# Patient Record
Sex: FEMALE | Race: WHITE
[De-identification: names, ages, dates, MRNs, and addresses within clinical notes are randomized per-mention and may not be internally consistent; named-entity substitution may affect disease eponyms.]

---

## 2018-03-29 ENCOUNTER — HOSPITAL ENCOUNTER (EMERGENCY)
Dept: HOSPITAL 31 - C.ER | Age: 58
Discharge: HOME | End: 2018-03-29
Payer: COMMERCIAL

## 2018-03-29 VITALS
OXYGEN SATURATION: 96 % | HEART RATE: 93 BPM | SYSTOLIC BLOOD PRESSURE: 146 MMHG | DIASTOLIC BLOOD PRESSURE: 90 MMHG | TEMPERATURE: 97.8 F | RESPIRATION RATE: 20 BRPM

## 2018-03-29 VITALS — BODY MASS INDEX: 33.5 KG/M2
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
## 2018-03-29 DIAGNOSIS — S42.295A: Primary | ICD-10-CM
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
## 2018-03-29 NOTE — C.PDOC
History Of Present Illness


58 year old female presents to the ER with a complaint of left shoulder pain 

after having a mechanical fall PTA. Patient states she was walking fast when 

she fell and landed on her left shoulder. Patient is right hand dominant, 

denies head injury, LOC, or other injury. Denies dizziness or weakness prior. 


Time Seen by Provider: 03/29/18 19:55


Chief Complaint (Nursing): Upper Extremity Problem/Injury


History Per: Patient, Family (daughter)


History/Exam Limitations: no limitations


Onset/Duration Of Symptoms: Hrs


Current Symptoms Are (Timing): Still Present


Recent travel outside of the [place]: No





Past Medical History


Reviewed: Historical Data, Nursing Documentation, Vital Signs


Vital Signs: 


 Last Vital Signs











Temp  97.8 F   03/29/18 19:51


 


Pulse  93 H  03/29/18 19:51


 


Resp  20   03/29/18 19:51


 


BP  146/90   03/29/18 19:51


 


Pulse Ox  96   03/29/18 23:10














- Medical History


PMH: Depression, HTN, Hypercholesterolemia, Osteoporosis


Surgical History: Cholecystectomy, Endoscopy


Family History: States: Unknown Family Hx





- Social History


Hx Alcohol Use: No


Hx Substance Use: No





- Immunization History


Hx Tetanus Toxoid Vaccination: No


Hx Influenza Vaccination: No


Hx Pneumococcal Vaccination: No





Review Of Systems


Musculoskeletal: Positive for: Shoulder Pain.  Negative for: Neck Pain, Back 

Pain


Skin: Negative for: Bruising


Neurological: Negative for: Weakness, Numbness, Other (LOC)





Physical Exam





- Physical Exam


Appears: Non-toxic, Other (uncomfortable)


Skin: Normal Color, Warm, Dry


Head: Atraumatic, Normacephalic


Eye(s): bilateral: Normal Inspection, PERRL, EOMI


Nose: Normal


Oral Mucosa: Moist


Neck: Normal ROM, No Midline Cervical Tenderness, No Paracervical Tenderness, 

Supple


Chest: Symmetrical


Cardiovascular: Rhythm Regular


Respiratory: Normal Breath Sounds


Extremity: Tenderness (Proximal left upper arm), Capillary Refill (<2 sec), No 

Deformity, Other (Decreased ROM of left shoulder secondary to pain)


Pulses: Left Radial: Normal, Right Radial: Normal


Neurological/Psych: Oriented x3, Normal Speech, Normal Sensation





ED Course And Treatment


O2 Sat by Pulse Oximetry: 96 (Room air)


Pulse Ox Interpretation: Normal





- Other Rad


  ** Left Shoulder x-ray


X-Ray: Interpreted by Me, Viewed By Me


Interpretation: Nondisplaced proximal humerus fracture.


Progress Note: Left shoulder x-ray ordered, results were positive for 

nondisplaced proximal humerus fracture. Tylenol administered, patient refused 

other pain medication. X-ray results discussed with patient, she was placed in 

a shoulder sling by RN, and instructed to follow up with other; daughter 

presents at bedside who translated, patient understands and agrees with plan.





Disposition





- Disposition


Referrals: 


Yash Spann MD [Staff Provider] - 


Disposition: HOME/ ROUTINE


Disposition Time: 20:31


Condition: STABLE


Additional Instructions: 


Rest and ice the area. Follow up with bone doctor in 1-2 days.  





Vaya a el mdico ortopdico clnica en 1-3 mendosa sin falta, para mas evaluacin. 

[place] los medicamentos  amy indicado.


Prescriptions: 


oxyCODONE/Acetaminophen [Percocet 5/325 mg Tab] 1 tab PO QID PRN #20 tab


 PRN Reason: Pain


Instructions:  Shoulder Fracture (DC)


Forms:  [employer] (English)


Print Language: Georgian





- Clinical Impression


Clinical Impression: 


 Shoulder fracture








- PA / NP / Resident Statement


MD/DO has reviewed & agrees with the documentation as recorded.





- Scribe Statement


The provider has reviewed the documentation as recorded by the Scribe





Adin Vazquez





All medical record entries made by the Scribe were at my direction and 

personally dictated by me. I have reviewed the chart and agree that the record 

accurately reflects my personal performance of the history, physical exam, 

medical decision making, and the department course for this patient. I have 

also personally directed, reviewed, and agree with the discharge instructions 

and disposition.

## 2018-03-30 NOTE — RAD
PROCEDURE:  Left shoulder dated 03/29/18 



HISTORY:

Trauma 



COMPARISON:

No prior.



FINDINGS:



BONES:

There is a comminuted appearing nondisplaced fracture involving the 

humeral head and surgical neck



JOINTS:

Normal. Mild degenerative osteoarthritis of the left 

acromioclavicular joint.



SOFT TISSUES:

Normal.



OTHER FINDINGS:

None. 



IMPRESSION:

Comminuted nondisplaced fracture left humeral head and neck as 

described.  Mild degenerative osteoarthritis left acromioclavicular 

joint.

## 2018-11-16 ENCOUNTER — HOSPITAL ENCOUNTER (EMERGENCY)
Dept: HOSPITAL 31 - C.ER | Age: 58
Discharge: HOME | End: 2018-11-16
Payer: COMMERCIAL

## 2018-11-16 VITALS
HEART RATE: 78 BPM | OXYGEN SATURATION: 98 % | TEMPERATURE: 98 F | RESPIRATION RATE: 18 BRPM | DIASTOLIC BLOOD PRESSURE: 73 MMHG | SYSTOLIC BLOOD PRESSURE: 116 MMHG

## 2018-11-16 VITALS — BODY MASS INDEX: 33.5 KG/M2

## 2018-11-16 DIAGNOSIS — I10: ICD-10-CM

## 2018-11-16 DIAGNOSIS — R07.89: Primary | ICD-10-CM

## 2018-11-16 DIAGNOSIS — E78.00: ICD-10-CM

## 2018-11-16 NOTE — C.PDOC
History Of Present Illness


58 year old female presents to the ED complaining of lower chest discomfort and 

pain to left trapezius area for 2 weeks. Reports pain is digitally and 

positionally reproducible. States she was seen by PMD 2 days ago and was 

diagnosed with costochondritis. Patient was instructed to use ice packs and take

Motrin for pain but she reports noncompliance. Denies any rash, fever, chills, 

shortness of breath, cough, or any other symptoms.  


Time Seen by Provider: 18 15:33


Chief Complaint (Nursing): Chest Pain


History Per: Patient


History/Exam Limitations: no limitations


Onset/Duration Of Symptoms: Days


Current Symptoms Are (Timing): Still Present


Associated Symptoms: denies: Nausea, Diaphoresis, Syncope





Past Medical History


Reviewed: Historical Data, Nursing Documentation, Vital Signs


Vital Signs: 





                                Last Vital Signs











Temp  98.9 F   18 15:20


 


Pulse  92 H  18 15:20


 


Resp  20   18 15:20


 


BP  149/98 H  18 15:20


 


Pulse Ox  98   18 15:20














- Medical History


PMH: Depression, HTN, Hypercholesterolemia, Osteoporosis


   Denies: Colonic Polyps


Surgical History: Cholecystectomy, Endoscopy


Family History: States: No Known Family Hx





- Social History


Hx Alcohol Use: No


Hx Substance Use: No





- Immunization History


Hx Tetanus Toxoid Vaccination: No


Hx Influenza Vaccination: No


Hx Pneumococcal Vaccination: No





Review Of Systems


Except As Marked, All Systems Reviewed And Found Negative.


Constitutional: Negative for: Fever, Chills


Cardiovascular: Positive for: Chest Pain


Respiratory: Negative for: Cough, Shortness of Breath


Musculoskeletal: Positive for: Other (pain to left trapezius )


Skin: Negative for: Rash





Physical Exam





- Physical Exam


Appears: Non-toxic, No Acute Distress


Skin: Warm, Dry


Head: Normacephalic


Eye(s): bilateral: Normal Inspection


Nose: Normal


Oral Mucosa: Moist


Neck: Normal ROM, Supple


Chest: Other (digitally reproducible discomfort to inferior sternum and xiphoid 

area)


Cardiovascular: Rhythm Regular


Respiratory: Normal Breath Sounds, No Rales, No Rhonchi, No Wheezing


Gastrointestinal/Abdominal: Soft, No Tenderness


Extremity: Bilateral: Atraumatic, Normal Color And Temperature, Normal ROM


Neurological/Psych: Oriented x3, Normal Speech


Gait: Steady





ED Course And Treatment


ECG: Interpreted By Me


ECG Rhythm: Sinus Rhythm


ECG Interpretation: Normal


Rate From EC


O2 Sat by Pulse Oximetry: 98 (RA)


Pulse Ox Interpretation: Normal





Medical Decision Making


Medical Decision Making: 





musculoskeletal lower sternal discomfort, digitally and positionally 

reproducable, no rash, normal ekg x 2 weeks.


seen for same by PMD 2 days ago, w same dx





NSAIDS/ice instructed and pt reassured








Disposition


Doctor Will See Patient In The: Office


Counseled Patient/Family Regarding: Studies Performed, Diagnosis





- Disposition


Referrals: 


Shaik Iyer MD [Staff Provider] - 


Disposition: HOME/ ROUTINE


Disposition Time: 16:17


Condition: GOOD


Additional Instructions: 


ibuprofen 400 mg cada 6 horas 


bolsa de hielo encima del pecho 1/2 hora por hora nada caliente





EKG normal





Instructions:  Costochondritis


Forms:  [employer] (English)


Print Language: Azerbaijani





- Clinical Impression


Clinical Impression: 


 Chest wall discomfort








- Scribe Statement


The provider has reviewed the documentation as recorded by the Scribe


Nataliia Bird








All medical record entries made by the Scribe were at my direction and 

personally dictated by me. I have reviewed the chart and agree that the record 

accurately reflects my personal performance of the history, physical exam, medi

lyn decision making, and the department course for this patient. I have also 

personally directed, reviewed, and agree with the discharge instructions and 

disposition.

## 2018-11-18 ENCOUNTER — HOSPITAL ENCOUNTER (OUTPATIENT)
Dept: HOSPITAL 31 - C.ER | Age: 58
Setting detail: OBSERVATION
LOS: 2 days | Discharge: HOME | End: 2018-11-20
Attending: INTERNAL MEDICINE | Admitting: INTERNAL MEDICINE
Payer: COMMERCIAL

## 2018-11-18 VITALS — BODY MASS INDEX: 35.7 KG/M2

## 2018-11-18 DIAGNOSIS — M81.0: ICD-10-CM

## 2018-11-18 DIAGNOSIS — I31.3: ICD-10-CM

## 2018-11-18 DIAGNOSIS — E78.00: ICD-10-CM

## 2018-11-18 DIAGNOSIS — Z90.49: ICD-10-CM

## 2018-11-18 DIAGNOSIS — Z95.5: ICD-10-CM

## 2018-11-18 DIAGNOSIS — R07.89: Primary | ICD-10-CM

## 2018-11-18 DIAGNOSIS — I25.10: ICD-10-CM

## 2018-11-18 DIAGNOSIS — I10: ICD-10-CM

## 2018-11-18 DIAGNOSIS — M32.9: ICD-10-CM

## 2018-11-18 LAB
ALBUMIN SERPL-MCNC: 4.5 G/DL (ref 3.5–5)
ALBUMIN/GLOB SERPL: 1.4 {RATIO} (ref 1–2.1)
ALT SERPL-CCNC: 27 U/L (ref 9–52)
AST SERPL-CCNC: 34 U/L (ref 14–36)
BASOPHILS # BLD AUTO: 0.1 K/UL (ref 0–0.2)
BASOPHILS NFR BLD: 0.6 % (ref 0–2)
BUN SERPL-MCNC: 16 MG/DL (ref 7–17)
CALCIUM SERPL-MCNC: 9.4 MG/DL (ref 8.6–10.4)
EOSINOPHIL # BLD AUTO: 0.1 K/UL (ref 0–0.7)
EOSINOPHIL NFR BLD: 0.8 % (ref 0–4)
ERYTHROCYTE [DISTWIDTH] IN BLOOD BY AUTOMATED COUNT: 13.9 % (ref 11.5–14.5)
GFR NON-AFRICAN AMERICAN: > 60
HGB BLD-MCNC: 14.9 G/DL (ref 11–16)
LYMPHOCYTES # BLD AUTO: 2.4 K/UL (ref 1–4.3)
LYMPHOCYTES NFR BLD AUTO: 26.7 % (ref 20–40)
MCH RBC QN AUTO: 32.3 PG (ref 27–31)
MCHC RBC AUTO-ENTMCNC: 33.6 G/DL (ref 33–37)
MCV RBC AUTO: 96.2 FL (ref 81–99)
MONOCYTES # BLD: 1 K/UL (ref 0–0.8)
MONOCYTES NFR BLD: 10.9 % (ref 0–10)
NEUTROPHILS # BLD: 5.5 K/UL (ref 1.8–7)
NEUTROPHILS NFR BLD AUTO: 61 % (ref 50–75)
NRBC BLD AUTO-RTO: 0 % (ref 0–2)
PLATELET # BLD: 309 K/UL (ref 130–400)
PMV BLD AUTO: 7.9 FL (ref 7.2–11.7)
RBC # BLD AUTO: 4.6 MIL/UL (ref 3.8–5.2)
WBC # BLD AUTO: 9 K/UL (ref 4.8–10.8)

## 2018-11-18 PROCEDURE — 80053 COMPREHEN METABOLIC PANEL: CPT

## 2018-11-18 PROCEDURE — 93005 ELECTROCARDIOGRAM TRACING: CPT

## 2018-11-18 PROCEDURE — 84484 ASSAY OF TROPONIN QUANT: CPT

## 2018-11-18 PROCEDURE — 85025 COMPLETE CBC W/AUTO DIFF WBC: CPT

## 2018-11-18 PROCEDURE — 99285 EMERGENCY DEPT VISIT HI MDM: CPT

## 2018-11-18 PROCEDURE — 85378 FIBRIN DEGRADE SEMIQUANT: CPT

## 2018-11-18 PROCEDURE — 96374 THER/PROPH/DIAG INJ IV PUSH: CPT

## 2018-11-18 PROCEDURE — 71046 X-RAY EXAM CHEST 2 VIEWS: CPT

## 2018-11-18 PROCEDURE — 93306 TTE W/DOPPLER COMPLETE: CPT

## 2018-11-18 PROCEDURE — 36415 COLL VENOUS BLD VENIPUNCTURE: CPT

## 2018-11-18 PROCEDURE — 96372 THER/PROPH/DIAG INJ SC/IM: CPT

## 2018-11-18 NOTE — C.PDOC
History Of Present Illness


58 year old female presents to the emergency department with complaints of two 

weeks of hypertension, high cholesterol, and chest pain at the center of her 

chest worse with palpation and movement of the arms. Patient states that she was

seen at [facility] previously, where she had a CT PE that resulted negative. Patient 

was seen by a cardiologist on Thursday 11-15-18 and discharged home. Patient 

presented once again on 18 with the same complaints. She states that he 

pain is non-radiating, and she denies nausea, vomiting, sweating, shortness of 

breath, and leg swelling. 


Time Seen by Provider: 18 17:44


Chief Complaint (Nursing): Chest Pain


History Per: Patient


History/Exam Limitations: no limitations


Onset/Duration Of Symptoms: Other (two weeks)


Current Symptoms Are (Timing): Still Present


Quality: "Pain"





Past Medical History


Reviewed: Historical Data, Nursing Documentation, Vital Signs


Vital Signs: 





                                Last Vital Signs











Temp  98.3 F   18 17:24


 


Pulse  93 H  18 17:24


 


Resp  20   18 17:24


 


BP  157/61 H  18 17:24


 


Pulse Ox  97   18 17:24














- Medical History


PMH: Depression, HTN, Hypercholesterolemia, Osteoporosis


   Denies: Colonic Polyps


Surgical History: Cholecystectomy, Endoscopy


Family History: States: Unknown Family Hx





- Social History


Hx Alcohol Use: No


Hx Substance Use: No





- Immunization History


Hx Tetanus Toxoid Vaccination: No


Hx Influenza Vaccination: No


Hx Pneumococcal Vaccination: No





Review Of Systems


Except As Marked, All Systems Reviewed And Found Negative.


Constitutional: Negative for: Fever, Chills


Cardiovascular: Positive for: Chest Pain





Physical Exam





- Physical Exam


Appears: Non-toxic, No Acute Distress


Skin: Warm, Dry


Head: Atraumatic, Tenderness


Eye(s): bilateral: Normal Inspection, PERRL, EOMI


Neck: Normal, Supple


Chest: Symmetrical, Tenderness (to palpation at the center of the chest)


Cardiovascular: Rhythm Regular, No Murmur


Respiratory: No Rales, No Rhonchi, No Wheezing


Gastrointestinal/Abdominal: Soft, No Tenderness, No Guarding, No Rebound


Extremity: Normal ROM (all extremities)


Neurological/Psych: Oriented x3, Normal Speech, Normal Cognition





ED Course And Treatment





- Laboratory Results


Result Diagrams: 


                                 18 17:57





                                 18 17:57


O2 Sat by Pulse Oximetry: 97 (RA)


Pulse Ox Interpretation: Normal





Medical Decision Making


Medical Decision Makin yr old female w/ hx of CAD w/ 2 stents p/w chest pain x2 weeks after being 

seen by PMD, x3 hospital visits and Cardiologist (pt does not remember name of 

cards) Has had negative w/u but continues w/ pain. Will seek imaging and labs. 


Low pretest wells- pt has had previous negative CTPE per family: will seek dimer





Plan:


EKG


CMP


Troponin


CBC


D-Dimer


CXR 








XR unremarkable, labs unremarkable


low pretest wells, dimer negative


appreicate oncuslt w/ Abulijannet: to admit to [facility] given recurrent CP 


appreciate consult w/ Dr. ELEUTERIO [facility]- to admit to his service


pt in NAD, agreeable to plan 





Disposition





- Disposition


Disposition: HOSPITALIZED


Disposition Time: 21:19


Condition: GOOD





- Clinical Impression


Clinical Impression: 


 Chest pain








- Scribe Statement


The provider has reviewed the documentation as recorded by the Scribe (Bar martinez)


Provider Attestation: 


All medical record entries made by the Scribe were at my direction and 

personally dictated by me. I have reviewed the chart and agree that the record 

accurately reflects my personal performance of the history, physical exam, 

medical decision making, and the department course for this patient. I have also

personally directed, reviewed, and agree with the discharge instructions and 

disposition.

## 2018-11-19 LAB
CK MB SERPL-MCNC: 0.43 NG/ML (ref 0–3.38)
CK MB SERPL-MCNC: 0.44 NG/ML (ref 0–3.38)

## 2018-11-19 RX ADMIN — ENOXAPARIN SODIUM SCH MG: 40 INJECTION SUBCUTANEOUS at 10:33

## 2018-11-19 RX ADMIN — PANTOPRAZOLE SODIUM SCH MG: 40 TABLET, DELAYED RELEASE ORAL at 10:07

## 2018-11-19 NOTE — RAD
Chest x-ray two views 



History: Chest pain. 



Comparison: 12/26/2016 



Findings: 



Mild venous congestion. 



Patchy increased markings at the left lung base.  



Cardiomegaly. 



Atherosclerotic calcification at the aortic knob. 



Degenerative changes in the spine. 



Right hilar prominence. 



Impression:



Mild venous congestion. 



Patchy increased markings at the left lung base.  



Cardiomegaly. 



Atherosclerotic calcification at the aortic knob. 



Degenerative changes in the spine. 



Right hilar prominence.

## 2018-11-19 NOTE — CARD
--------------- APPROVED REPORT --------------





Date of service: 11/18/2018



EKG Measurement

Heart [id number]

NC [id number]

[id number]

[id number]

[id number]



<Conclusion>

Normal sinus rhythm

Left posterior fascicular block

Abnormal ECG

## 2018-11-19 NOTE — CP.PCM.CON
History of Present Illness





- History of Present Illness


History of Present Illness: 





I was asked to see patient by Dr chetna Rutherford.





Patient was seen 11/19/18 at 1735





Patient is a 58 year old female with CAD s/p PCI, HTn, DM who presents with 

chest pain.  The patient describes left sided chest pain which occurs at rest.  

She states she had associated dyspnea.  The pain is worse with inspiration.  She

previously went to [facility] but was discharged.  She denies recent travel or sick 

contacts.  She denies fever or chills.





Review of Systems





- Constitutional


Constitutional: absent: As Per HPI, Anorexia, Chills, Daytime Sleepiness, 

Excessive Sweating, Fatigue, Fever, Frequent Falls, Headache, Increased 

Appetite, Lethargy, Malaise, Night Sweats, Snoring, Sleep Apnea, Weight Gain, 

Weight Loss, Weakness, Other





- EENT


Eyes: absent: As Per HPI, Blind Spots, Blurred Vision, Change in Vision, 

Decreased Night Vision, Diplopia, Discharge, Dry Eye, Exophthalmos, Floaters, 

Irritation, Itchy Eyes, Loss of Peripheral Vision, Pain, Photophobia, Requires 

Corrective Lenses, Sees Flashes, Spots in Vision, Tunnel Vision, Other Visual 

Disturbances, Loss of Vision, Other


Nose/Mouth/Throat: absent: As Per HPI, Epistaxis, Nasal Congestion, Nasal 

Discharge, Nasal Obstruction, Nasal Trauma, Nose Pain, Post Nasal Drip, Sinus 

Pain, Sinus Pressure, Bleeding Gums, Change in Voice, Dental Pain, Dry Mouth, 

Dysphagia, Halitosis, Hoarsness, Lip Swelling, Mouth Lesions, Mouth Pain, 

Odynophagia, Sore Throat, Throat Swelling, Tongue Swelling, Facial Pain, Neck 

Pain, Neck Mass, Other





- Cardiovascular


Cardiovascular: Chest Pain at Rest





- Respiratory


Respiratory: absent: As Per HPI, Cough, Dyspnea, Hemoptysis, Dyspnea on 

Exertion, Wheezing, Snoring, Stridor, Pain on Inspiration, Chest Congestion, 

Excessive Mucous Production, Change in Mucous Color, Pain with Coughing, Other





- Gastrointestinal


Gastrointestinal: absent: As Per HPI, Abdominal Pain, Belching, Bloating, Change

in Bowel Habits, Change in Stool Character, Coffee Ground Emesis, Constipation, 

Cramping, Diarrhea, Dyspepsia, Dysphagia, Early Satiety, Excessive Flatus, Fecal

Incontinence, Heartburn, Hematemesis, Hematochezia, Loose Stools, Melena, 

Nausea, Odynophagia, Temesmus, Vomiting, Other





- Genitourinary


Genitourinary: absent: As Per HPI, Change in Urinary Stream, Difficulty 

Urinating, Dysuria, Flank Pain, Hematuria, Pyuria, Nocturia, Urinary 

Incontinence, Urinary Frequency, Urinary Hesitance, Urinary Urgency, Voiding 

Freq/Small Amts, Freq UTI, Hx Renal/Bladder Calculi, Hx /Renal Surgery, 

Bladder Distension, Other





- Musculoskeletal


Musculoskeletal: absent: As Per HPI, Abnormal Gait, Arthralgias, Atrophy, Back 

Pain, Deformity, Joint Swelling, Limited Range of Motion, Loss of Height, Muscle

Cramps, Muscle Weakness, Myalgias, Neck Pain, Numbness, Radiating Pain into 

Limb, Stiffness, Tingling, Other





- Integumentary


Integumentary: absent: As Per HPI, Acne, Alopecia, Bleeding Lesions, Change in 

Hair, Change in Nails, Change in Pigmentation, Changing Lesions, Dry Skin, 

Erythema, Furuncle, Hirsutism, Lesions, New Lesions, Non-Healing Lesions, 

Photosensitivity, Pruritus, Rash, Skin Pain, Skin Ulcer, Sores, Striae, 

Swelling, Unusual Bruising, Wounds, Jaundice, Other





- Neurological


Neurological: absent: As Per HPI, Abnormal Gait, Abnormal Hearing, Abnormal 

Movements, Abnormal Speech, Behavioral Changes, Burning Sensations, Confusion, 

Convulsions, Disequilibrium, Dizziness, Numbness, Focal Weakness, Frequent 

Falls, Headaches, Lack of Coordination, Loss of Vision, Memory Loss, 

Paresthesias, Radicular Pain, Restless Legs, Sensory Deficit, Syncope, Tingling,

Tremor, Vertigo, Weakness, Other Visual Disturbances, Other





- Psychiatric


Psychiatric: absent: As Per HPI, Abnormal Sleep Pattern, Anhedonia, Anxiety, 

Auditory Hallucinations, Behavioral Changes, Change in Appetite, Change in 

Libido, Confusion, Depression, Difficulty Concentrating, Hallucinations, 

Homicidal Ideation, Hopelessness, Irritability, Memory Loss, Mood Swings, Panic 

Attacks, Paranoia, Suicidal Ideation, Visual Hallucinations, Tactile Hallucina

tions, Other





- Endocrine


Endocrine: absent: As Per HPI, Change in Body Appearance, Change in Libido, Cold

Intolorance, Deepening of Voice, Excessive Sweating, Fatigue, Flushing, Heat 

Intolorance, Increase in Ring/Shoe/Hat Size, Palpitations, Polydipsia, Polypha

sukhdeep, Polyuria, Other





- Hematologic/Lymphatic


Hematologic: absent: As Per HPI, Easy Bleeding, Easy Bruising, Lymphadenopathy, 

Other





Past Patient History





- Infectious Disease


Hx of Infectious Diseases: None





- Past Medical History & Family History


Past Medical History?: Yes





- Past Social History


Smoking Status: Never Smoked





- CARDIAC


Hx Hypercholesterolemia: Yes


Hx Hypertension: Yes





- ENDOCRINE/METABOLIC


Hx Systemic Lupus Erythematosus: Yes





- MUSCULOSKELETAL/RHEUMATOLOGICAL


Hx Osteoporosis: Yes





- GASTROINTESTINAL


Hx Gastrointestinal Disorders: No





- PSYCHIATRIC


Hx Depression: Yes


Hx Substance Use: No





- SURGICAL HISTORY


Hx Cholecystectomy: Yes





- ANESTHESIA


Hx Anesthesia: Yes


Hx Anesthesia Reactions: No


Hx Malignant Hyperthermia: No





Meds


Allergies/Adverse Reactions: 


                                    Allergies











Allergy/AdvReac Type Severity Reaction Status Date / Time


 


iodine Allergy   Verified 11/18/18 17:27


 


shellfish derived Allergy   Verified 11/18/18 17:27


 


SEAFOOD Allergy Intermediate RASH Uncoded 11/18/18 17:27














- Medications


Medications: 


                               Current Medications





Aspirin (Ecotrin)  81 mg PO DAILY [facility], [facility]


   Last Admin: 11/19/18 10:02 Dose:  81 mg


Cyclobenzaprine HCl (Flexeril)  5 mg PO DAILY PRN


   PRN Reason: Pain, moderate (4-7)


Donepezil HCl (Aricept)  5 mg PO DAILY [facility], [facility]


   Last Admin: 11/19/18 09:55 Dose:  5 mg


Enoxaparin Sodium (Lovenox)  40 mg SC DAILY [facility], [facility]


   Last Admin: 11/19/18 10:33 Dose:  40 mg


Ergocalciferol (Drisdol 50,000 Intl Units Cap)  1 cap PO QWK [facility], [facility]1983


Fenofibrate (Tricor)  145 mg PO DAILY [facility]


   Last Admin: 11/19/18 10:08 Dose:  145 mg


Hydrochlorothiazide (Hydrodiuril)  25 mg PO DAILY [facility]


   Last Admin: 11/19/18 10:04 Dose:  25 mg


Isosorbide Mononitrate (Imdur Er)  30 mg PO DAILY [facility]


   Last Admin: 11/19/18 10:06 Dose:  30 mg


Losartan Potassium (Cozaar)  100 mg PO DAILY [facility]


   Last Admin: 11/19/18 10:00 Dose:  100 mg


Pantoprazole Sodium (Protonix Ec Tab)  40 mg PO DAILY [facility]


   Last Admin: 11/19/18 10:07 Dose:  40 mg


Rosuvastatin Calcium (Crestor)  10 mg PO HS [facility]


Ticagrelor (Brilinta)  90 mg PO BID [facility]


   Last Admin: 11/19/18 09:58 Dose:  90 mg


Tramadol HCl (Ultram)  50 mg PO TID [facility]


   Last Admin: 11/19/18 10:22 Dose:  50 mg











Physical Exam





- Constitutional


Appears: Non-toxic





- Head Exam


Head Exam: NORMAL INSPECTION





- Eye Exam


Eye Exam: Normal appearance





- ENT Exam


ENT Exam: Mucous Membranes Dry





- Neck Exam


Neck exam: Positive for: Full Rom





- Cardiovascular Exam


Cardiovascular Exam: REGULAR RHYTHM, RRR, +S1, +S2.  absent: Diastolic murmur, 

Gallop





- GI/Abdominal Exam


GI & Abdominal Exam: Normal Bowel Sounds, Soft.  absent: Distended, Guarding, 

Hernia, Organomegaly





- Rectal Exam


Rectal Exam: Deferred





- Extremities Exam


Extremities exam: Positive for: normal inspection, pedal pulses present.  

Negative for: pedal edema, tenderness





- Back Exam


Back exam: NORMAL INSPECTION





- Neurological Exam


Neurological exam: Alert, Oriented x3





- Psychiatric Exam


Psychiatric exam: Normal Affect





- Skin


Skin Exam: Normal Color





Results





- Vital Signs


Recent Vital Signs: 


                                Last Vital Signs











Temp  98.3 F   11/19/18 15:00


 


Pulse  91 H  11/19/18 15:00


 


Resp  20   11/19/18 15:00


 


BP  145/95 H  11/19/18 15:00


 


Pulse Ox  97   11/19/18 17:26














- Labs


Result Diagrams: 


                                 11/18/18 17:57





                                 11/18/18 17:57


Labs: 


                         Laboratory Results - last 24 hr











  11/18/18 11/18/18 11/18/18





  17:57 17:57 19:56


 


WBC  9.0  


 


RBC  4.60  


 


Hgb  14.9  


 


Hct  44.2  


 


MCV  96.2  


 


MCH  32.3 H  


 


MCHC  33.6  


 


RDW  13.9  


 


Plt Count  309  


 


MPV  7.9  


 


Neut % (Auto)  61.0  


 


Lymph % (Auto)  26.7  


 


Mono % (Auto)  10.9 H  


 


Eos % (Auto)  0.8  


 


Baso % (Auto)  0.6  


 


Neut # (Auto)  5.5  


 


Lymph # (Auto)  2.4  


 


Mono # (Auto)  1.0 H  


 


Eos # (Auto)  0.1  


 


Baso # (Auto)  0.1  


 


D-Dimer, Quantitative    < 200


 


Sodium   139 


 


Potassium   4.3 


 


Chloride   102 


 


Carbon Dioxide   26 


 


Anion Gap   15 


 


BUN   16 


 


Creatinine   0.6 L 


 


Est GFR ( Amer)   > 60 


 


Est GFR (Non-Af Amer)   > 60 


 


Random Glucose   87 


 


Calcium   9.4 


 


Total Bilirubin   0.8 


 


AST   34 


 


ALT   27 


 


Alkaline Phosphatase   93 


 


Total Creatine Kinase   


 


CK-MB (Mass)   


 


Troponin I   < 0.0120 


 


Total Protein   7.6 


 


Albumin   4.5 


 


Globulin   3.1 


 


Albumin/Globulin Ratio   1.4 














  11/19/18 11/19/18





  01:57 09:57


 


WBC  


 


RBC  


 


Hgb  


 


Hct  


 


MCV  


 


MCH  


 


MCHC  


 


RDW  


 


Plt Count  


 


MPV  


 


Neut % (Auto)  


 


Lymph % (Auto)  


 


Mono % (Auto)  


 


Eos % (Auto)  


 


Baso % (Auto)  


 


Neut # (Auto)  


 


Lymph # (Auto)  


 


Mono # (Auto)  


 


Eos # (Auto)  


 


Baso # (Auto)  


 


D-Dimer, Quantitative  


 


Sodium  


 


Potassium  


 


Chloride  


 


Carbon Dioxide  


 


Anion Gap  


 


BUN  


 


Creatinine  


 


Est GFR ( Amer)  


 


Est GFR (Non-Af Amer)  


 


Random Glucose  


 


Calcium  


 


Total Bilirubin  


 


AST  


 


ALT  


 


Alkaline Phosphatase  


 


Total Creatine Kinase  37  33


 


CK-MB (Mass)  0.44  0.43


 


Troponin I  < 0.0120  < 0.0120


 


Total Protein  


 


Albumin  


 


Globulin  


 


Albumin/Globulin Ratio  














- EKG Data


EKG Interpreted by: Myself


EKG shows normal: Sinus rhythm





Assessment & Plan


(1) Chest pain


Assessment and Plan: 


I reviewed the echocardiogram.  Left ventricular function is normal.  There are 

no regional wall motion abnormalities.  However I see thickening and 

calcification of the right heart border and a small pericardial effusion.  The 

symptoms may be due to pericarditis.  recommend the addition of indocin to 

assess if the patient is due to inflammation.  Cardiac enzymes are negative.





given negative enzymes, patient would benefit from a stress test (history of 

stent placement) but can be done as outpatient.


Status: Acute   





(2) HTN (hypertension)


Assessment and Plan: 


blood pressure control


Status: Acute   





(3) CAD (coronary artery disease)


Assessment and Plan: 


enzymes negative.  outpatient stress test


Status: Acute   





(4) Pericardial effusion


Assessment and Plan: 


small without tamponade


Status: Acute

## 2018-11-19 NOTE — CP.PCM.HP
Past Patient History





- Infectious Disease


Hx of Infectious Diseases: None





- Past Medical History & Family History


Past Medical History?: Yes





- Past Social History


Smoking Status: Never Smoked





- CARDIAC


Hx Hypercholesterolemia: Yes


Hx Hypertension: Yes





- ENDOCRINE/METABOLIC


Hx Systemic Lupus Erythematosus: Yes





- MUSCULOSKELETAL/RHEUMATOLOGICAL


Hx Osteoporosis: Yes





- GASTROINTESTINAL


Hx Gastrointestinal Disorders: No





- PSYCHIATRIC


Hx Depression: Yes


Hx Substance Use: No





- SURGICAL HISTORY


Hx Cholecystectomy: Yes





- ANESTHESIA


Hx Anesthesia: Yes


Hx Anesthesia Reactions: No


Hx Malignant Hyperthermia: No





Meds


Allergies/Adverse Reactions: 


                                    Allergies











Allergy/AdvReac Type Severity Reaction Status Date / Time


 


iodine Allergy   Verified 11/18/18 17:27


 


shellfish derived Allergy   Verified 11/18/18 17:27


 


SEAFOOD Allergy Intermediate RASH Uncoded 11/18/18 17:27














Physical Exam





- Constitutional


Appears: Well





- Head Exam


Head Exam: ATRAUMATIC, NORMAL INSPECTION, NORMOCEPHALIC





- Eye Exam


Eye Exam: EOMI, Normal appearance, PERRL


Pupil Exam: NORMAL ACCOMODATION, PERRL





- ENT Exam


ENT Exam: Mucous Membranes Moist, Normal Exam





- Neck Exam


Neck exam: Positive for: Normal Inspection





- Respiratory Exam


Respiratory Exam: Decreased Breath Sounds





- Cardiovascular Exam


Cardiovascular Exam: REGULAR RHYTHM, +S1, +S2





- GI/Abdominal Exam


GI & Abdominal Exam: Diminished Bowel Sounds, Soft





- Rectal Exam


Rectal Exam: Deferred





Results





- Vital Signs


Recent Vital Signs: 





                                Last Vital Signs











Temp  98.3 F   11/19/18 15:00


 


Pulse  91 H  11/19/18 15:00


 


Resp  20   11/19/18 15:00


 


BP  145/95 H  11/19/18 15:00


 


Pulse Ox  97   11/19/18 17:26














- Labs


Result Diagrams: 


                                 11/18/18 17:57





                                 11/18/18 17:57


Labs: 





                         Laboratory Results - last 24 hr











  11/18/18 11/19/18 11/19/18





  19:56 01:57 09:57


 


D-Dimer, Quantitative  < 200  


 


Total Creatine Kinase   37  33


 


CK-MB (Mass)   0.44  0.43


 


Troponin I   < 0.0120  < 0.0120

## 2018-11-20 VITALS — HEART RATE: 89 BPM

## 2018-11-20 VITALS — DIASTOLIC BLOOD PRESSURE: 79 MMHG | SYSTOLIC BLOOD PRESSURE: 111 MMHG | TEMPERATURE: 98 F

## 2018-11-20 VITALS — OXYGEN SATURATION: 96 %

## 2018-11-20 VITALS — RESPIRATION RATE: 20 BRPM

## 2018-11-20 LAB
ALBUMIN SERPL-MCNC: 4.4 G/DL (ref 3.5–5)
ALBUMIN/GLOB SERPL: 1.5 {RATIO} (ref 1–2.1)
ALT SERPL-CCNC: 30 U/L (ref 9–52)
AST SERPL-CCNC: 23 U/L (ref 14–36)
BASOPHILS # BLD AUTO: 0 K/UL (ref 0–0.2)
BASOPHILS NFR BLD: 0.3 % (ref 0–2)
BUN SERPL-MCNC: 20 MG/DL (ref 7–17)
CALCIUM SERPL-MCNC: 9.2 MG/DL (ref 8.6–10.4)
EOSINOPHIL # BLD AUTO: 0.1 K/UL (ref 0–0.7)
EOSINOPHIL NFR BLD: 1.2 % (ref 0–4)
ERYTHROCYTE [DISTWIDTH] IN BLOOD BY AUTOMATED COUNT: 14.1 % (ref 11.5–14.5)
GFR NON-AFRICAN AMERICAN: > 60
HGB BLD-MCNC: 15.8 G/DL (ref 11–16)
LYMPHOCYTES # BLD AUTO: 1.8 K/UL (ref 1–4.3)
LYMPHOCYTES NFR BLD AUTO: 21.8 % (ref 20–40)
MCH RBC QN AUTO: 32.4 PG (ref 27–31)
MCHC RBC AUTO-ENTMCNC: 33.6 G/DL (ref 33–37)
MCV RBC AUTO: 96.2 FL (ref 81–99)
MONOCYTES # BLD: 0.8 K/UL (ref 0–0.8)
MONOCYTES NFR BLD: 8.9 % (ref 0–10)
NEUTROPHILS # BLD: 5.8 K/UL (ref 1.8–7)
NEUTROPHILS NFR BLD AUTO: 67.8 % (ref 50–75)
NRBC BLD AUTO-RTO: 0 % (ref 0–2)
PLATELET # BLD: 331 K/UL (ref 130–400)
PMV BLD AUTO: 8 FL (ref 7.2–11.7)
RBC # BLD AUTO: 4.89 MIL/UL (ref 3.8–5.2)
WBC # BLD AUTO: 8.5 K/UL (ref 4.8–10.8)

## 2018-11-20 RX ADMIN — ENOXAPARIN SODIUM SCH MG: 40 INJECTION SUBCUTANEOUS at 10:02

## 2018-11-20 RX ADMIN — PANTOPRAZOLE SODIUM SCH MG: 40 TABLET, DELAYED RELEASE ORAL at 10:02

## 2018-11-20 NOTE — CARD
--------------- APPROVED REPORT --------------





Date of service: 11/19/2018



EXAM: Two-dimensional and M-mode echocardiogram with Doppler and 

color Doppler.



Other Information 

Quality : LimitedRhythm : 



RISK FACTORS

Hyperlipidemia



2D DIMENSIONS 

IVSd1.0   (0.7-1.1cm)Aortic Root (2D)2.2   (2.0-3.7cm)

LVDd4.0   (3.9-5.9cm)PWd0.8   (0.7-1.1cm)

LVDs2.5   (2.5-4.0cm)FS (%) 38.1   %

LVEF (%)68.8   (>50%)LVEF (Giron's)67 %

IVC0.00 cm



M-Mode DIMENSIONS 

Left Atrium (MM)3.24   (2.5-4.0cm)IVSd0.90   (0.7-1.1cm)

Aortic Root2.38   (2.2-3.7cm)LVDd5.52   (4.0-5.6cm)

Aortic Cusp Exc.1.84   (1.5-2.0cm)PWd0.90   (0.7-1.1cm)

FS (%) 39   %LVDs3.37   (2.0-3.8cm)

LVEF (%)69   (>50%)



Mitral Valve

MV E Ufwcbzrz97.1cm/sMV A Rvbdxwed65.1cm/sE/A ratio0.7



TDI

Lateral E' Peak V9.90cm/sMedial E' Peak V4.74cm/sE/Lateral E'4.4

E/Medial E'9.1



Tricuspid Valve

TR Peak [id number]/sTR Peak [id number]



<Conclusion>

very poor window.

la,lv & ra rv size appears normal.

normal lv wall motion,thickness & systolic funciton with lvef of 

60-65%.

lv diastolic dysfunction grade one.

aortic & pv not well betty but probably normal.

mitral & tv appears normal.

mild tr with pulmonary systolic pressures of 37 mm of hg,c/w mild 

pulmonary hypertension.

normal size aortic root.

anterior echo free space,probably pericardial fat.

## 2018-11-20 NOTE — CARD
--------------- APPROVED REPORT --------------





Date of service: 11/16/2018



EKG Measurement

Heart [id number]

KS [id number]

[id number]

[id number]

[id number]



<Conclusion>

Normal sinus rhythm

Left posterior fascicular block

Abnormal ECG

## 2018-11-20 NOTE — CP.PCM.PN
Subjective





- Date & Time of Evaluation


Date of Evaluation: 11/20/18


Time of Evaluation: 14:43





- Subjective


Subjective: 





PATIENT SEEN AND EXAMINED AT THE BEDSIDE





Objective





- Vital Signs/Intake and Output


Vital Signs (last 24 hours): 


                                        











Temp Pulse Resp BP Pulse Ox


 


 98.1 F   87   20   115/87   96 


 


 11/20/18 07:00  11/20/18 07:00  11/20/18 07:00  11/20/18 07:00  11/20/18 07:00








Intake and Output: 


                                        











 11/20/18 11/20/18





 06:59 18:59


 


Intake Total 120 


 


Balance 120 














- Medications


Medications: 


                               Current Medications





Aspirin (Ecotrin)  81 mg PO DAILY [facility]


   Last Admin: 11/20/18 10:02 Dose:  81 mg


Cyclobenzaprine HCl (Flexeril)  5 mg PO DAILY PRN


   PRN Reason: Pain, moderate (4-7)


   Last Admin: 11/20/18 02:11 Dose:  5 mg


Donepezil HCl (Aricept)  5 mg PO DAILY [facility]


   Last Admin: 11/20/18 10:02 Dose:  5 mg


Enoxaparin Sodium (Lovenox)  40 mg SC DAILY [facility]


   Last Admin: 11/20/18 10:02 Dose:  40 mg


Ergocalciferol (Drisdol 50,000 Intl Units Cap)  1 cap PO QWK [facility]


Fenofibrate (Tricor)  145 mg PO DAILY [facility]


   Last Admin: 11/20/18 10:30 Dose:  145 mg


Hydrochlorothiazide (Hydrodiuril)  25 mg PO DAILY [facility]


   Last Admin: 11/20/18 10:02 Dose:  25 mg


Isosorbide Mononitrate (Imdur Er)  30 mg PO DAILY [facility]


   Last Admin: 11/20/18 10:02 Dose:  30 mg


Losartan Potassium (Cozaar)  100 mg PO DAILY [facility]


   Last Admin: 11/20/18 10:02 Dose:  100 mg


Pantoprazole Sodium (Protonix Ec Tab)  40 mg PO DAILY [facility]


   Last Admin: 11/20/18 10:02 Dose:  40 mg


Rosuvastatin Calcium (Crestor)  10 mg PO HS [facility]


   Last Admin: 11/19/18 21:23 Dose:  10 mg


Ticagrelor (Brilinta)  90 mg PO BID [facility]


   Last Admin: 11/20/18 10:30 Dose:  90 mg


Tramadol HCl (Ultram)  50 mg PO TID [facility]


   Last Admin: 11/20/18 14:34 Dose:  50 mg











- Labs


Labs: 


                                        





                                 11/20/18 14:11 





                                 11/18/18 17:57 











Assessment and Plan





- Assessment and Plan (Free Text)


Assessment: 


FOLLOW UP WITH DR GÓMEZ BALL OR PMD IN HIS OFFICE -------CALL FOR APPOINTMENT


FOLLOW UP WITH DR MCFADDEN IN HIS OFFICE ----CALL FOR APPOINTMENT


   ADDRESS YOUR STRESS TEST AS OUT PATIENT AT YOUR VISIT


CONTINUE HOME MEDICATION 


ACTIVITY AS TOLERATED


CALL DR GÓMEZ BALL OR YOUR PMD OR GO TO THE EMERGENCY ROOM IF SYMPTOM RETURN OR 

WORSENING

## 2018-11-20 NOTE — CP.PCM.PN
Subjective





- Date & Time of Evaluation


Date of Evaluation: 11/20/18


Time of Evaluation: 12:30





- Subjective


Subjective: 


clinically same





Objective





- Vital Signs/Intake and Output


Vital Signs (last 24 hours): 


                                        











Temp Pulse Resp BP Pulse Ox


 


 98.1 F   87   20   115/87   96 


 


 11/20/18 07:00  11/20/18 07:00  11/20/18 07:00  11/20/18 07:00  11/20/18 07:00








Intake and Output: 


                                        











 11/20/18 11/20/18





 06:59 18:59


 


Intake Total 120 


 


Balance 120 














- Medications


Medications: 


                               Current Medications





Aspirin (Ecotrin)  81 mg PO DAILY [facility]


   Last Admin: 11/20/18 10:02 Dose:  81 mg


Cyclobenzaprine HCl (Flexeril)  5 mg PO DAILY PRN


   PRN Reason: Pain, moderate (4-7)


   Last Admin: 11/20/18 02:11 Dose:  5 mg


Donepezil HCl (Aricept)  5 mg PO DAILY [facility]


   Last Admin: 11/20/18 10:02 Dose:  5 mg


Enoxaparin Sodium (Lovenox)  40 mg SC DAILY [facility]


   Last Admin: 11/20/18 10:02 Dose:  40 mg


Ergocalciferol (Drisdol 50,000 Intl Units Cap)  1 cap PO QWK [facility]


Fenofibrate (Tricor)  145 mg PO DAILY [facility]


   Last Admin: 11/20/18 10:30 Dose:  145 mg


Hydrochlorothiazide (Hydrodiuril)  25 mg PO DAILY [facility]


   Last Admin: 11/20/18 10:02 Dose:  25 mg


Isosorbide Mononitrate (Imdur Er)  30 mg PO DAILY [facility]


   Last Admin: 11/20/18 10:02 Dose:  30 mg


Losartan Potassium (Cozaar)  100 mg PO DAILY [facility]


   Last Admin: 11/20/18 10:02 Dose:  100 mg


Pantoprazole Sodium (Protonix Ec Tab)  40 mg PO DAILY [facility]


   Last Admin: 11/20/18 10:02 Dose:  40 mg


Rosuvastatin Calcium (Crestor)  10 mg PO HS [facility]


   Last Admin: 11/19/18 21:23 Dose:  10 mg


Ticagrelor (Brilinta)  90 mg PO BID [facility]


   Last Admin: 11/20/18 10:30 Dose:  90 mg


Tramadol HCl (Ultram)  50 mg PO TID [facility]


   Last Admin: 11/20/18 10:01 Dose:  50 mg











- Labs


Labs: 


                                        





                                 11/18/18 17:57 





                                 11/18/18 17:57 











- Constitutional


Appears: Well





- Head Exam


Head Exam: ATRAUMATIC, NORMAL INSPECTION, NORMOCEPHALIC





- Eye Exam


Eye Exam: EOMI, Normal appearance, PERRL


Pupil Exam: NORMAL ACCOMODATION, PERRL





- ENT Exam


ENT Exam: Mucous Membranes Moist, Normal Exam





- Neck Exam


Neck Exam: Full ROM, Normal Inspection.  absent: Lymphadenopathy





- Respiratory Exam


Respiratory Exam: Decreased Breath Sounds





- Cardiovascular Exam


Cardiovascular Exam: REGULAR RHYTHM, +S1, +S2





- GI/Abdominal Exam


GI & Abdominal Exam: Soft, Diminished Bowel Sounds





- Rectal Exam


Rectal Exam: Deferred